# Patient Record
Sex: MALE | Race: OTHER | HISPANIC OR LATINO | URBAN - METROPOLITAN AREA
[De-identification: names, ages, dates, MRNs, and addresses within clinical notes are randomized per-mention and may not be internally consistent; named-entity substitution may affect disease eponyms.]

---

## 2019-07-22 ENCOUNTER — EMERGENCY (EMERGENCY)
Facility: HOSPITAL | Age: 29
LOS: 1 days | Discharge: HOME | End: 2019-07-22
Admitting: EMERGENCY MEDICINE
Payer: SELF-PAY

## 2019-07-22 VITALS
WEIGHT: 149.91 LBS | HEIGHT: 68 IN | SYSTOLIC BLOOD PRESSURE: 128 MMHG | RESPIRATION RATE: 16 BRPM | TEMPERATURE: 99 F | HEART RATE: 68 BPM | OXYGEN SATURATION: 98 % | DIASTOLIC BLOOD PRESSURE: 89 MMHG
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
## 2019-07-22 DIAGNOSIS — M25.561 PAIN IN RIGHT KNEE: ICD-10-CM
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
## 2019-07-22 DIAGNOSIS — S89.91XA UNSPECIFIED INJURY OF RIGHT LOWER LEG, INITIAL ENCOUNTER: ICD-10-CM

## 2019-07-22 DIAGNOSIS — F17.200 NICOTINE DEPENDENCE, UNSPECIFIED, UNCOMPLICATED: ICD-10-CM

## 2019-07-22 PROCEDURE — 99283 EMERGENCY DEPT VISIT LOW MDM: CPT

## 2019-07-22 PROCEDURE — 73562 X-RAY EXAM OF KNEE 3: CPT | Mod: 26,RT

## 2019-07-22 PROCEDURE — 73562 X-RAY EXAM OF KNEE 3: CPT

## 2019-07-22 RX ORDER — IBUPROFEN 200 MG
1 TABLET ORAL
Qty: 16 | Refills: 0
Start: 2019-07-22

## 2019-07-22 RX ORDER — IBUPROFEN 200 MG
600 TABLET ORAL ONCE
Refills: 0 | Status: COMPLETED | OUTPATIENT
Start: 2019-07-22 | End: 2019-07-22

## 2019-07-22 RX ADMIN — Medication 600 MILLIGRAM(S): at 21:25

## 2019-07-22 NOTE — ED PROVIDER NOTE - NSFOLLOWUPINSTRUCTIONS_ED_ALL_ED_FT
Use NSAID (ibuprofen) for pain as prescribed.  Follow up with orthopedist or orthopedic clinic (clinic is every Thursday).    Clinic phone number is [phone].  Return to the Emergency Department if you have any new or worsening symptoms, or if you have any concerns.      Use ibuprofeno para el dolor según lo prescrito.  Diego un seguimiento con el ortopedista o la clínica ortopédica (la clínica es todos los jueves).  El número de teléfono de la clínica es [phone].  Regrese al Departamento de Emergencias si tiene síntomas nuevos o que empeoran, o si tiene alguna inquietud.  _____________________________________________    Tratamiento RICE para cuidados de rutina de lesiones  RICE Therapy for Routine Care of Injuries  Introducción  Los cuidados de rutina de muchas lesiones incluyen reposo, hielo, compresión y elevación (tratamiento RICE). A menudo se recomienda el tratamiento de RICE para las lesiones de los tejidos blandos, por ejemplo, kenny distensión muscular, esguinces, moretones y lesiones por uso excesivo. También se puede utilizar para algunas lesiones óseas. El tratamiento RICE puede ayudar a aliviar el dolor y reducir la hinchazón.    Materiales necesarios:  Hielo.  Bolsa plástica.  Toalla.  Vendaje elástico.  Kenny o varias almohadas para mantener en alto (elevar) la parte lesionada del cuerpo.  Cómo cuidar la lesión con el tratamiento RICE  Image   Reposo     Diego reposar la clarice de la lesión. [place] puede ayudar con el proceso de curación. Habitualmente, el reposo implica limitar las actividades normales y evitar usar la parte lesionada del cuerpo. En general, puede retomar lashaun actividades normales cuando el médico lo autorice y usted pueda hacerlas sin muchas molestias.    Si melissa reposar demasiado la lesión, es posible que no se cure basilio. Algunas lesiones se curan mejor con el movimiento temprano en lugar de demasiado reposo. Hable con el médico sobre cómo limitar lashaun actividades y si usted debe comenzar con ejercicios de amplitud de movimiento para la lesión.    Hielo     Aplique hielo en la lesión para reducir la hinchazón y el dolor. No aplique el hielo directamente sobre la piel.  Ponga el hielo en kenny bolsa plástica.  Coloque kenny toalla entre la piel y la bolsa de hielo.  Coloque el hielo ministerio 20 minutos, de 2 a 3 veces por día. Use hielo tantos días geraldo se lo haya indicado el médico.  Compresión  ImageAplique presión (compresión) sobre la clarice lesionada para controlar la hinchazón, cristine apoyo y ayudar a aliviar las molestias. Kenny venda elástica sirve para la compresión. Si tiene kenny venda elástica, siga estos consejos generales:  Póngase la venda geraldo lo indica el fabricante de la venda que está usando.  No ajuste demasiado la venda. Eso puede bloquear (interrumpir) la circulación en la clarice del brazo o de la pierna por debajo de la venda.   Si kenny parte del cuerpo más allá de la venda se torna color luis, se adormece, se enfría, se hincha o le causa más dolor, es probable que la venda esté demasiado ajustada. Si eso ocurre, retire la venda y vuelva a colocarla más floja.  Quítese y vuelva a colocarse la venda cada 3 o 4 horas, o geraldo se lo haya indicado el médico.  Consulte al médico si la venda parece estar agravando los problemas en lugar de mejorándolos.  Elevación  Eleve la clarice lesionada para disminuir la hinchazón y el dolor. Si es posible, eleve la clarice lesionada al nivel o por encima del corazón o del centro del pecho.    Comuníquese con un médico si:  El dolor y la hinchazón continúan.  Los síntomas empeoran en vez de mejorar.  Tener estos problemas puede significar que usted necesita más evaluación y pruebas de diagnóstico por imágenes, geraldo radiografías o kenny resonancia magnética (RM). En algunos casos, las radiografías no muestran que hay un hueso pequeño roto (fractura) hasta días después de ocurrida la lesión. Concurra a las citas de seguimiento con ramírez médico. Consulte al médico o pregunte en el departamento donde se realiza la prueba de diagnóstico por imágenes, cuándo estarán listos los resultados.    Solicite ayuda de inmediato si:  Siente un dolor intenso repentino en la clarice de la lesión o por debajo de esta.  Tiene enrojecimiento o aumenta la hinchazón alrededor de la lesión.  Tiene hormigueo o adormecimiento en la clarice de la lesión o por debajo de esta que no mejoran después de quitarse la venda elástica.  Resumen  Los cuidados de rutina de muchas lesiones incluyen reposo, hielo, compresión y elevación (tratamiento RICE). El tratamiento RICE puede ayudar a aliviar el dolor y reducir la hinchazón.  A menudo se recomienda el tratamiento de RICE para las lesiones de los tejidos blandos, por ejemplo, kenny distensión muscular, esguinces, moretones y lesiones por uso excesivo. También se puede utilizar para algunas lesiones óseas.  Solicite atención médica si el dolor y la hinchazón continúan o si los síntomas empeoran en vez de mejorar.  Esta información no tiene geraldo fin reemplazar el consejo del médico. Asegúrese de hacerle al médico cualquier pregunta que tenga. Use NSAID (ibuprofen) for pain as prescribed.  Follow up with orthopedist or orthopedic clinic (clinic is every Thursday).    Clinic phone number is [phone].  Return to the Emergency Department if you have any new or worsening symptoms, or if you have any concerns.      Use ibuprofeno para el dolor según lo prescrito.  Herman un seguimiento con el ortopedista o la clínica ortopédica (la clínica es todos los jueves).  El número de teléfono de la clínica es [phone].  Regrese al Departamento de Emergencias si tiene síntomas nuevos o que empeoran, o si tiene alguna inquietud.  _____________________________________________    Tratamiento RICE para cuidados de rutina de lesiones  RICE Therapy for Routine Care of Injuries  Introducción  Los cuidados de rutina de muchas lesiones incluyen reposo, hielo, compresión y elevación (tratamiento RICE). A menudo se recomienda el tratamiento de RICE para las lesiones de los tejidos blandos, por ejemplo, kenny distensión muscular, esguinces, moretones y lesiones por uso excesivo. También se puede utilizar para algunas lesiones óseas. El tratamiento RICE puede ayudar a aliviar el dolor y reducir la hinchazón.    Materiales necesarios:  Hielo.  Bolsa plástica.  Toalla.  Vendaje elástico.  Kenny o varias almohadas para mantener en alto (elevar) la parte lesionada del cuerpo.  Cómo cuidar la lesión con el tratamiento RICE  Image   Reposo     [place] reposar la clarice de la lesión. [place] puede ayudar con el proceso de curación. Habitualmente, el reposo implica limitar las actividades normales y evitar usar la parte lesionada del cuerpo. En general, puede retomar lashaun actividades normales cuando el médico lo autorice y usted pueda hacerlas sin muchas molestias.    Si melissa reposar demasiado la lesión, es posible que no se cure basilio. Algunas lesiones se curan mejor con el movimiento temprano en lugar de demasiado reposo. Hable con el médico sobre cómo limitar lashaun actividades y si usted debe comenzar con ejercicios de amplitud de movimiento para la lesión.    Hielo     Aplique hielo en la lesión para reducir la hinchazón y el dolor. No aplique el hielo directamente sobre la piel.  Ponga el hielo en kenny bolsa plástica.  Coloque kenny toalla entre la piel y la bolsa de hielo.  Coloque el hielo ministerio 20 minutos, de 2 a 3 veces por día. Use hielo tantos días geraldo se lo haya indicado el médico.  Compresión  ImageAplique presión (compresión) sobre la clarice lesionada para controlar la hinchazón, cristine apoyo y ayudar a aliviar las molestias. Kenny venda elástica sirve para la compresión. Si tiene kenny venda elástica, siga estos consejos generales:  Póngase la venda geraldo lo indica el fabricante de la venda que está usando.  No ajuste demasiado la venda. Eso puede bloquear (interrumpir) la circulación en la clarice del brazo o de la pierna por debajo de la venda.   Si kenny parte del cuerpo más allá de la venda se torna color luis, se adormece, se enfría, se hincha o le causa más dolor, es probable que la venda esté demasiado ajustada. Si eso ocurre, retire la venda y vuelva a colocarla más floja.  Quítese y vuelva a colocarse la venda cada 3 o 4 horas, o geraldo se lo haya indicado el médico.  Consulte al médico si la venda parece estar agravando los problemas en lugar de mejorándolos.  Elevación  Eleve la clarice lesionada para disminuir la hinchazón y el dolor. Si es posible, eleve la clarice lesionada al nivel o por encima del corazón o del centro del pecho.    Comuníquese con un médico si:  El dolor y la hinchazón continúan.  Los síntomas empeoran en vez de mejorar.  Tener estos problemas puede significar que usted necesita más evaluación y pruebas de diagnóstico por imágenes, geraldo radiografías o kenny resonancia magnética (RM). En algunos casos, las radiografías no muestran que hay un hueso pequeño roto (fractura) hasta días después de ocurrida la lesión. Concurra a las citas de seguimiento con ramírez médico. Consulte al médico o pregunte en el departamento donde se realiza la prueba de diagnóstico por imágenes, cuándo estarán listos los resultados.    Solicite ayuda de inmediato si:  Siente un dolor intenso repentino en la clarice de la lesión o por debajo de esta.  Tiene enrojecimiento o aumenta la hinchazón alrededor de la lesión.  Tiene hormigueo o adormecimiento en la clarice de la lesión o por debajo de esta que no mejoran después de quitarse la venda elástica.  Resumen  Los cuidados de rutina de muchas lesiones incluyen reposo, hielo, compresión y elevación (tratamiento RICE). El tratamiento RICE puede ayudar a aliviar el dolor y reducir la hinchazón.  A menudo se recomienda el tratamiento de RICE para las lesiones de los tejidos blandos, por ejemplo, kenny distensión muscular, esguinces, moretones y lesiones por uso excesivo. También se puede utilizar para algunas lesiones óseas.  Solicite atención médica si el dolor y la hinchazón continúan o si los síntomas empeoran en vez de mejorar.  Esta información no tiene geraldo fin reemplazar el consejo del médico. Asegúrese de hacerle al médico cualquier pregunta que tenga.  ___________________________________________________    Uso de muletas en los adultos  Crutch Use, Adult  Introducción  Las muletas se utilizan para aliviar el peso de kenny pierna o de un pie cuando está parado o camina. Es posible que necesite muletas para recuperarse después de kenny lesión o un procedimiento. Es importante usar muletas del tamaño adecuado. Cuando las muletas son del tamaño adecuado:  Debe taylor un espacio de 2 a 3 dedos entre cada muleta y la axila.  El peso debe recaer en ramírez mano y no en la axila.  Es importante que un médico lo haya visto usar las muletas de manera efectiva antes de que las utilice en ramírez casa.    ¿Cuáles son los riesgos?  El uso inadecuado de las muletas puede lesionar los hombros, los brazos, la espalda, las axilas y las km. Para evitar que esto suceda, asegúrese de que las muletas madisyn del tamaño adecuado y no herman presión sobre las axilas cuando las use.    Al usar muletas, también tiene un riesgo mayor de caerse. Para evitar caídas mientras usa las muletas en ramírez casa o en el trabajo, tome las siguientes medidas:  Mueva los muebles o los obstáculos que se encuentran en el paso cuando sea posible. [place]ídale a alguien que lo ayude con esto según necesite.  Mantenga los pasillos basilio iluminados.  Use kenny mochila para no tener que llevar objetos en las km.  Quite alfombras, cables y otros elementos del piso con los que pueda tropezarse.  Cómo usar lashaun muletas  La manera en la que usará las muletas dependerá del motivo por el cual las necesita. Es posible que el médico le indique que no ponga nada de peso sobre la pierna afectada (descarga). O maninder vez el médico le permita poner algo de peso, sweetie no todo, sobre la pierna afectada (carga parcial). Siga las indicaciones del médico acerca del peso que puede soportar. No soporte un peso que le provoque dolor en la clarice afectada.    Caminar     Párese sobre la pierna hillary y levante ambas muletas al mismo tiempo.    Coloque las muletas a kenny distancia de un paso adelante de usted.    Lleve la pierna hillary hacia adelante para alcanzar o aterrizar ligeramente kendra de las muletas.    Repita el procedimiento.    Subir escalones     ImageSi no hay barandas:  Suba con la pierna hillary.    Suba con las muletas y la pierna lesionada.    Repita el procedimiento.    Si hay barandas:  Sostenga ambas muletas en kenny mano.    Coloque la otra mano en la baranda.    Coloque el peso en los brazos y suba el escalón con la pierna hillary.    Lleve las muletas y la pierna lesionada hasta el escalón.    Continúe de esta forma.    Si se siente inestable en los escalones, puede subir apoyando las nalgas. Para subir, siéntese en el escalón más bajo con la pierna lesionada adelante y sostenga ambas muletas apoyadas contra los escalones en la otra mano. Luego, use la mano iban y la pierna hillary para sostenerse y levantar las nalgas hasta el siguiente escalón.    Bajar escalones     ImageSi no hay barandas:  Baje con la pierna lesionada y las muletas.    Baje con la pierna hillary.    Repita el procedimiento.    Si hay barandas:  Coloque la mano en la baranda.    Sostenga ambas muletas con la mano iban.    Baje la pierna lesionada y la muleta hasta el escalón debajo de usted. Asegúrese de mantener las puntas de las muletas en el centro del escalón, no en el borde.    Baje la pierna hillary hasta el siguiente escalón.    Repita el procedimiento.    Si se siente inestable en los escalones, puede bajar apoyando las nalgas. Para bajar, siéntese en el escalón más alto con la pierna lesionada adelante y sostenga ambas muletas apoyadas contra los escalones en la otra mano. Luego, use la mano iban y la pierna hillary para sostenerse y bajar las nalgas hasta el siguiente escalón.    Pararse     Sostenga la pierna lesionada hacia adelante.    Agarre el apoyabrazos con kenny mano y la parte superior de las muletas con la otra mano.    Utilizando el apoyabrazos y las muletas, empújese hacia arriba hasta ponerse de pie.    Sentarse     Sostenga la pierna lesionada hacia adelante.    Agarre el apoyabrazos con kenny mano y la parte superior de las muletas con la otra mano.    Baje el cuerpo lentamente hasta lograr sentarse.    Comuníquese con un médico si:  Se siente inestable al utilizar las muletas.  Aparece un dolor nuevo.  Presenta adormecimiento u hormigueo.  Las muletas no son del tamaño adecuado.  Solicite ayuda de inmediato si:  Se .  Esta información no tiene geraldo fin reemplazar el consejo del médico. Asegúrese de hacerle al médico cualquier pregunta que tenga.

## 2019-07-22 NOTE — ED PROVIDER NOTE - CLINICAL SUMMARY MEDICAL DECISION MAKING FREE TEXT BOX
Right knee injury: likely strain or sprain.  Low suspicion for fx.  No signs of skin or joint infection.  Plan: RICE, NSAIDs, ortho follow up.

## 2019-07-22 NOTE — ED PROVIDER NOTE - OBJECTIVE STATEMENT
healthy 30 yo m was at work today carrying a vacuum  while walking up stairs when he felt a "pop" and pain in the anterior right knee.  no direct trauma to knee.  no hx of knee problems, and no hx of clicking or locking.  pain severe and he called an ambulance.  able to limp with only partial weight bearing.  no other injuries.

## 2019-07-22 NOTE — ED PROVIDER NOTE - NS ED ROS FT
CONST:  no fever/chills  NEURO: no tingling or numbness  CARDIO: no chest pain  PULM: no dyspnea   GI: no abdominal pain, nausea or vomiting  SKIN: no rash  MSK: no swelling of knee/leg; no locking or clicking

## 2019-07-22 NOTE — ED ADULT NURSE NOTE - NSIMPLEMENTINTERV_GEN_ALL_ED
Implemented All Universal Safety Interventions:  [place] to call system. Call bell, personal items and telephone within reach. Instruct patient to call for assistance. Room bathroom lighting operational. Non-slip footwear when patient is off stretcher. Physically safe environment: no spills, clutter or unnecessary equipment. Stretcher in lowest position, wheels locked, appropriate side rails in place.

## 2019-07-22 NOTE — ED PROVIDER NOTE - PHYSICAL EXAMINATION
GEN: awake, alert, NAD  EYES: Conj clear, Pupils equal and round.  PULM: Lungs clear bilat  CV: RRR S1S2  GI: Abd soft, nontender  MSK: Rt Hip FROM nontender.  Right knee no swelling, patella not ballottable.  FROM. No crepitus.  Mild medial joint line tenderness.  Able SLR.  No lig laxity MCL, LCL, ACL. PCL.  Derek neg.  No lower leg swelling or calf tenderness. Ankle FROM nontender.  Strong DP and PT pulses.  + antalgic limp.  Foot no swelling.  WWP, brisk cap refill.    SKIN: normal color and turgor, no rash  NEURO: Alert, oriented. STEVENSON normally.  Speech clear.  Gait steady.

## 2019-07-22 NOTE — ED ADULT NURSE NOTE - OBJECTIVE STATEMENT
29y M, A&ox3, presents to ed for right knee pain. No numbness nor tingling. no obvious deformity. Reports twist knee. no head injury, no abrasions nor redness. +ROM. +pulse

## 2019-07-22 NOTE — ED ADULT NURSE NOTE - CHPI ED NUR SYMPTOMS NEG
no abrasion/no difficulty bearing weight/no stiffness/no tingling/no back pain/no bruising/no deformity/no numbness/no weakness/no fever

## 2019-07-22 NOTE — ED PROVIDER NOTE - CARE PROVIDER_API CALL
Masood Gresham)  Orthopaedic Surgery  [address]  [place], [place] [address]  Phone: [phone]  Fax: [phone]  Follow Up Time:

## 2019-07-22 NOTE — ED ADULT TRIAGE NOTE - CHIEF COMPLAINT QUOTE
Pt BIBA CO Right Knee pain this afternoon.  Pt states "It just started hurting me."  PT denies falls, trauma, dizziness, N/V/D, SOB, Fevers and CP.

## 2019-12-27 ENCOUNTER — EMERGENCY (EMERGENCY)
Facility: HOSPITAL | Age: 29
LOS: 1 days | Discharge: HOME | End: 2019-12-27
Admitting: EMERGENCY MEDICINE
Payer: COMMERCIAL

## 2019-12-27 VITALS
OXYGEN SATURATION: 97 % | DIASTOLIC BLOOD PRESSURE: 70 MMHG | HEART RATE: 95 BPM | SYSTOLIC BLOOD PRESSURE: 132 MMHG | WEIGHT: 149.91 LBS | TEMPERATURE: 99 F | RESPIRATION RATE: 16 BRPM

## 2019-12-27 PROCEDURE — 99284 EMERGENCY DEPT VISIT MOD MDM: CPT | Mod: 25,57

## 2019-12-27 PROCEDURE — 73140 X-RAY EXAM OF FINGER(S): CPT

## 2019-12-27 PROCEDURE — 26750 TREAT FINGER FRACTURE EACH: CPT | Mod: F6

## 2019-12-27 PROCEDURE — 26750 TREAT FINGER FRACTURE EACH: CPT | Mod: 54,RT

## 2019-12-27 PROCEDURE — 99283 EMERGENCY DEPT VISIT LOW MDM: CPT | Mod: 25

## 2019-12-27 PROCEDURE — 73140 X-RAY EXAM OF FINGER(S): CPT | Mod: 26,RT

## 2019-12-27 RX ORDER — IBUPROFEN 200 MG
600 TABLET ORAL ONCE
Refills: 0 | Status: COMPLETED | OUTPATIENT
Start: 2019-12-27 | End: 2019-12-27

## 2019-12-27 NOTE — ED PROVIDER NOTE - CLINICAL SUMMARY MEDICAL DECISION MAKING FREE TEXT BOX
28 yo M with no pmh c/o R index finger pain s/p closing his finger in a door 2 days ago. Denies fever, chills, numbness, tingling. 30 yo M with no pmh c/o R index finger pain s/p closing his finger in a door 2 days ago. Denies fever, chills, numbness, tingling. R index finger- +small subungal hematoma to corner of nail with healing abrasion/small lac, tenderness to distal phalanx; NV intact 30 yo M with no pmh c/o R index finger pain s/p closing his finger in a door 2 days ago. Denies fever, chills, numbness, tingling. R index finger- +small subungal hematoma to corner of nail with healing abrasion/small lac, tenderness to distal phalanx; NV intact. Xray with small partial tip avulsion fx, splinted, will f/u with hand

## 2019-12-27 NOTE — ED PROVIDER NOTE - PHYSICAL EXAMINATION
CONSTITUTIONAL: Well-appearing; well-nourished; in no apparent distress.   HEAD: Normocephalic; atraumatic.   EYES: PERRL; EOM intact; conjunctiva and sclera clear  ENT: normal nose; no rhinorrhea; normal pharynx with no erythema or lesions.   NECK: Supple; non-tender;   CARDIOVASCULAR: Normal S1, S2; no murmurs, rubs, or gallops. Regular rate and rhythm.   RESPIRATORY: Breathing easily; breath sounds clear and equal bilaterally; no wheezes, rhonchi, or rales.  MSK: R index finger- +small subungal hematoma to corner of nail with healing abrasion/small lac, tenderness to distal phalanx; NV intact   EXT: No cyanosis or edema; N/V intact  SKIN: Normal for age and race; warm; dry; good turgor; no apparent lesions or rash.

## 2019-12-27 NOTE — ED ADULT NURSE NOTE - OBJECTIVE STATEMENT
Pt. c/o R index finger pain and swelling s/p closing door on finger 2 days ago. Pt. took tylenol yesterday and ibuprofen at 1600 w/ no pain relief.

## 2019-12-27 NOTE — ED PROVIDER NOTE - OBJECTIVE STATEMENT
30 yo M with no pmh c/o R index finger pain s/p closing his finger in a door 2 days ago. Denies fever, chills, numbness, tingling.

## 2019-12-27 NOTE — ED PROVIDER NOTE - CARE PLAN
Principal Discharge DX:	Closed nondisplaced fracture of distal phalanx of right index finger, initial encounter

## 2019-12-27 NOTE — ED PROVIDER NOTE - NSFOLLOWUPINSTRUCTIONS_ED_ALL_ED_FT
Fractura de dedo    LO QUE NECESITA SABER:    Kenny fractura del dedo de la mano es kenny rotura en jamie o más de los huesos en el dedo de la mano.    INSTRUCCIONES SOBRE EL [facility]EDA HOSPITALARIA:    Regrese a la kayli de emergencias si:    El yeso o la férula se mojan, se dañan o se le caen.      El yeso o la férula se sienten muy apretados.      Usted tiene dolor intenso.      El dedo lesionado se encuentra entumecido frío o pálido.    Llame a ramírez médico o especialista en km si:    Aun después del tratamiento, empeora ramírez dolor o inflamación.      Usted tiene preguntas o inquietudes acerca de ramírez condición o cuidado.    Medicamentos:Es posible que usted necesite alguno de los siguientes:     Los CLEVE,geraldo el ibuprofeno, ayudan a disminuir la inflamación, el dolor y la fiebre. Michael medicamento está disponible con o sin kenny receta médica. Los CLEVE pueden causar sangrado estomacal o problemas renales en ciertas personas. Si usted santy un medicamento anticoagulante, siempre pregúntele a ramírez médico si los CLEVE son seguros para usted. Siempre radha la etiqueta de michael medicamento y siga las instrucciones.      Acetaminofénalivia el dolor y baja la fiebre. Está disponible sin receta médica. Pregunte la cantidad y la frecuencia con que debe tomarlos. Siga las indicaciones. Radha las etiquetas de todos los demás medicamentos que esté usando para saber si también contienen acetaminofén, o pregunte a ramírez médico o farmacéutico. El acetaminofén puede causar daño en el hígado cuando no se santy de forma correcta. No use más de 4 gramos (4000 miligramos) en total de acetaminofeno en un día.      Puede administrarsepodrían administrarse. Pregunte al médico cómo debe rivera michael medicamento de forma larios. Algunos medicamentos recetados para el dolor contienen acetaminofén. No tome otros medicamentos que contengan acetaminofén sin consultarlo con ramírez médico. Demasiado acetaminofeno puede causar daño al hígado. Los medicamentos recetados para el dolor podrían causar estreñimiento. Pregunte a ramírez médico geraldo prevenir o tratar estreñimiento.      [place] lashaun medicamentos geraldo se le haya indicado.Consulte con ramírez médico si usted zan que ramírez medicamento no le está ayudando o si presenta efectos secundarios. Infórmele si es alérgico a cualquier medicamento. Mantenga kenny lista actualizada de los medicamentos, las vitaminas y los productos herbales que santy. Incluya los siguientes datos de los medicamentos: cantidad, frecuencia y motivo de administración. Traiga con usted la lista o los envases de las píldoras a lashaun citas de seguimiento. Lleve la lista de los medicamentos con usted en dinorah de kenny emergencia.    Cuidados personales:    Use ramírez férula geraldo se le indique.No retire la férula sin la autorización del médico o especialista de mano.      Aplique hieloen el dedo de 15 a 20 minutos cada hora o geraldo se le indique. Use kenny compresa de hielo o ponga hielo triturado en kenny bolsa de plástico. Cúbralo con kenny toalla antes de aplicarlo sobre ramírez piel. El hielo ayuda a evitar daño al tejido y a disminuir la inflamación y el dolor.      Eleveel dedo por encima del nivel del corazón con la mayor frecuencia posible. [place] va a disminuir inflamación y el dolor. Coloque ramírez mano sobre almohadas o cobijas para mantenerlas elevadas cómodamente.      Vaya a terapia física según indicaciones.Un fisioterapeuta le puede enseñar ejercicios para ayudarle a mejorar el movimiento y la fuerza, y para disminuir el dolor.    Acuda a kenny consulta de control con ramírez médico o especialista en km dentro de los 2 días:[place] lashaun preguntas para que se acuerde de hacerlas ministerio lashaun visitas.

## 2019-12-27 NOTE — ED PROVIDER NOTE - PATIENT PORTAL LINK FT
You can access the FollowMyHealth Patient Portal offered by [facility] by registering at the following website: [email or web address]. By joining [employer]’s FollowMyHealth portal, you will also be able to view your health information using other applications (apps) compatible with our system.

## 2019-12-27 NOTE — ED PROVIDER NOTE - CARE PROVIDER_API CALL
Tara So)  Orthopaedic Surgery  [address]  [place] [address]  Phone: [phone]  Fax: [phone]  Follow Up Time:     Chon Godinez)  Plastic Surgery; Surgery of the Hand  [address], [address]  [place] [address]  Phone: [phone]  Fax: [phone]  Follow Up Time:

## 2019-12-27 NOTE — ED PROVIDER NOTE - DIAGNOSTIC INTERPRETATION
ER PA: Aster Nash  INTERPRETATION: small tip partial avulsion fx; no soft tissue swelling noted; normal bony alignment.

## 2019-12-28 PROBLEM — Z78.9 OTHER SPECIFIED HEALTH STATUS: Chronic | Status: ACTIVE | Noted: 2019-07-22
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
## 2020-01-02 DIAGNOSIS — Z79.1 LONG TERM (CURRENT) USE OF NON-STEROIDAL ANTI-INFLAMMATORIES (NSAID): ICD-10-CM

## 2020-01-02 DIAGNOSIS — S62.660A NONDISPLACED FRACTURE OF DISTAL PHALANX OF RIGHT INDEX FINGER, INITIAL ENCOUNTER FOR CLOSED FRACTURE: ICD-10-CM

## 2020-01-02 DIAGNOSIS — M79.644 PAIN IN RIGHT FINGER(S): ICD-10-CM

## 2020-01-02 DIAGNOSIS — S60.221A CONTUSION OF RIGHT HAND, INITIAL ENCOUNTER: ICD-10-CM
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
## 2022-08-24 ENCOUNTER — EMERGENCY (EMERGENCY)
Facility: HOSPITAL | Age: 32
LOS: 1 days | Discharge: HOME | End: 2022-08-24
Attending: EMERGENCY MEDICINE | Admitting: EMERGENCY MEDICINE
Payer: OTHER MISCELLANEOUS

## 2022-08-24 VITALS
OXYGEN SATURATION: 96 % | SYSTOLIC BLOOD PRESSURE: 139 MMHG | HEIGHT: 68 IN | WEIGHT: 179.02 LBS | DIASTOLIC BLOOD PRESSURE: 93 MMHG | RESPIRATION RATE: 16 BRPM | TEMPERATURE: 98 F | HEART RATE: 61 BPM

## 2022-08-24 PROCEDURE — 72220 X-RAY EXAM SACRUM TAILBONE: CPT | Mod: 26

## 2022-08-24 PROCEDURE — 72220 X-RAY EXAM SACRUM TAILBONE: CPT

## 2022-08-24 PROCEDURE — 99284 EMERGENCY DEPT VISIT MOD MDM: CPT | Mod: 25

## 2022-08-24 PROCEDURE — 72110 X-RAY EXAM L-2 SPINE 4/>VWS: CPT | Mod: 26

## 2022-08-24 PROCEDURE — 72100 X-RAY EXAM L-S SPINE 2/3 VWS: CPT

## 2022-08-24 PROCEDURE — 72100 X-RAY EXAM L-S SPINE 2/3 VWS: CPT | Mod: 26

## 2022-08-24 RX ORDER — IBUPROFEN 200 MG
600 TABLET ORAL ONCE
Refills: 0 | Status: COMPLETED | OUTPATIENT
Start: 2022-08-24 | End: 2022-08-24

## 2022-08-24 RX ORDER — IBUPROFEN 200 MG
1 TABLET ORAL
Qty: 30 | Refills: 0
Start: 2022-08-24 | End: 2022-09-02

## 2022-08-24 RX ADMIN — Medication 600 MILLIGRAM(S): at 20:03

## 2022-08-24 NOTE — ED PROVIDER NOTE - OBJECTIVE STATEMENT
fell today at work on stairs, down about 4 stairs.  fell onto back/tailbone  co pain in lower back/tailbone  able to walk  no numbness weakness  no other injuries, did not hit head

## 2022-08-24 NOTE — ED PROVIDER NOTE - PATIENT PORTAL LINK FT
You can access the FollowMyHealth Patient Portal offered by [facility] by registering at the following website: [email or web address]. By joining [employer]’s FollowMyHealth portal, you will also be able to view your health information using other applications (apps) compatible with our system.

## 2022-08-24 NOTE — ED ADULT TRIAGE NOTE - CHIEF COMPLAINT QUOTE
Pt c/o mechanical fall down 4 stairs, landing on buttocks. Reports lower back pain. Denies head injury, LOC, numbness/tingling, not on blood thinners.

## 2022-08-24 NOTE — ED PROVIDER NOTE - CROS ED CARDIOVAS ALL NEG
Patient Education        A Healthy Lifestyle: Care Instructions  Your Care Instructions     A healthy lifestyle can help you feel good, stay at a healthy weight, and have plenty of energy for both work and play. A healthy lifestyle is something you can share with your whole family. A healthy lifestyle also can lower your risk for serious health problems, such as high blood pressure, heart disease, and diabetes. You can follow a few steps listed below to improve your health and the health of your family. Follow-up care is a key part of your treatment and safety. Be sure to make and go to all appointments, and call your doctor if you are having problems. It's also a good idea to know your test results and keep a list of the medicines you take. How can you care for yourself at home? · Do not eat too much sugar, fat, or fast foods. You can still have dessert and treats now and then. The goal is moderation. · Start small to improve your eating habits. Pay attention to portion sizes, drink less juice and soda pop, and eat more fruits and vegetables. ? Eat a healthy amount of food. A 3-ounce serving of meat, for example, is about the size of a deck of cards. Fill the rest of your plate with vegetables and whole grains. ? Limit the amount of soda and sports drinks you have every day. Drink more water when you are thirsty. ? Eat plenty of fruits and vegetables every day. Have an apple or some carrot sticks as an afternoon snack instead of a candy bar. Try to have fruits and/or vegetables at every meal.  · Make exercise part of your daily routine. You may want to start with simple activities, such as walking, bicycling, or slow swimming. Try to be active 30 to 60 minutes every day. You do not need to do all 30 to 60 minutes all at once. For example, you can exercise 3 times a day for 10 or 20 minutes.  Moderate exercise is safe for most people, but it is always a good idea to talk to your doctor before starting an exercise program.  · Keep moving. Matteo Bidding the lawn, work in the garden, or [employer]. Take the stairs instead of the elevator at work. · If you smoke, quit. People who smoke have an increased risk for heart attack, stroke, cancer, and other lung illnesses. Quitting is hard, but there are ways to boost your chance of quitting tobacco for good. ? Use nicotine gum, patches, or lozenges. ? Ask your doctor about stop-smoking programs and medicines. ? Keep trying. In addition to reducing your risk of diseases in the future, you will notice some benefits soon after you stop using tobacco. If you have shortness of breath or asthma symptoms, they will likely get better within a few weeks after you quit. · Limit how much alcohol you drink. Moderate amounts of alcohol (up to 2 drinks a day for men, 1 drink a day for women) are okay. But drinking too much can lead to liver problems, high blood pressure, and other health problems. Family health  If you have a family, there are many things you can do together to improve your health. · Eat meals together as a family as often as possible. · Eat healthy foods. This includes fruits, vegetables, lean meats and dairy, and whole grains. · Include your family in your fitness plan. Most people think of activities such as jogging or tennis as the way to fitness, but there are many ways you and your family can be more active. Anything that makes you breathe hard and gets your heart pumping is exercise. Here are some tips:  ? Walk to do errands or to take your child to school or the bus.  ? Go for a family bike ride after dinner instead of watching TV. Where can you learn more? Go to https://[email or web address] org and sign in to your [employer] account. Enter X365 in the [employer] box to learn more about \"A Healthy Lifestyle: Care Instructions. \"     If you do not have an account, please click on the \"Sign Up Now\" link.   Current as of: June 16, 2021               Content Version: 13.0  © 5545-3967 Healthwise, [employer]. Care instructions adapted under license by [facility] ([facility]). If you have questions about a medical condition or this instruction, always ask your healthcare professional. [address] any warranty or liability for your use of this information. negative...

## 2022-08-24 NOTE — ED PROVIDER NOTE - NSFOLLOWUPINSTRUCTIONS_ED_ALL_ED_FT
follow up regular doctor in next 1 week        Tailbone Injury       The tailbone (coccyx) is the small bone at the lower end of the spine. A tailbone injury may involve stretched ligaments, bruising, or a broken bone (fracture). Tailbone injuries can be painful, and some may take a long time to heal.      What are the causes?    This condition may be caused by:  •Falling and landing on the tailbone.      •Repeated strain or friction from sitting for long periods of time. This may include actions such as rowing and bicycling.      •Childbirth.      In some cases, the cause may not be known.      What are the signs or symptoms?    Symptoms of this condition include:  •Pain in the tailbone area or lower back, especially when sitting.      •Pain or difficulty when standing up from a sitting position.      •Bruising or swelling in the tailbone area.      •Painful bowel movements.      •In women, pain during intercourse.        How is this diagnosed?    This condition may be diagnosed based on:  •Your symptoms.      •A physical exam.      If your health care provider suspects a fracture, you may have additional tests, such as:  •X-rays.      •CT scan.      •MRI.        How is this treated?    Most tailbone injuries heal on their own in 4–6 weeks. However, recovery time may be longer if the injury involves a fracture.    Treatment for this condition may include:  •NSAIDs or other over-the-counter medicines to help relieve your pain.      •Using a large, rubber or inflated ring or cushion to take pressure off the tailbone when sitting.      •Physical therapy.      •Injecting the tailbone area with local anesthesia and steroid medicine. This is not normally needed unless the pain does not improve over time with over-the-counter pain medicines.        Follow these instructions at home:    Activity     •Avoid sitting for long periods of time.    •To prevent repeating an injury that is caused by strain or friction:  •Wear appropriate padding and sports gear when bicycling and rowing.        •Increase your activity as the pain allows. Perform any exercises that are recommended by your health care provider or physical therapist.      Managing pain, stiffness, and swelling   •To help decrease discomfort when sitting:   •Sit on your rubber or inflated ring or cushion as told by your health care provider.      •Lean forward when you sit.      •If directed, apply ice to the injured area:  •Put ice in a plastic bag.      •Place a towel between your skin and the bag.      •Leave the ice on for 20 minutes, 2–3 times per day for the first 1–2 days.      •If directed, apply heat to the affected area as often as told by your health care provider. Use the heat source that your health care provider recommends, such as a moist heat pack or a heating pad.  •Place a towel between your skin and the heat source.      •Leave the heat on for 20–30 minutes.      •Remove the heat if your skin turns bright red. This is especially important if you are unable to feel pain, heat, or cold. You may have a greater risk of getting burned.        General instructions     •Take over-the-counter and prescription medicines only as told by your health care provider.    •To prevent or treat constipation or painful bowel movements, your health care provider may recommend that you:  •Drink enough fluid to keep your urine pale yellow.      •Eat foods that are high in fiber, such as fresh fruits and vegetables, whole grains, and beans.      •Limit foods that are high in fat and processed sugars, such as fried and sweet foods.      •Take an over-the-counter or prescription medicine for constipation.        •Keep all follow-up visits as directed by your health care provider. This is important.        Contact a health care provider if:    •Your pain becomes worse or is not controlled with medicine.      •Your bowel movements cause a great deal of discomfort.      •You are unable to have a bowel movement after 4 days.      •You have pain during intercourse.        Summary    •A tailbone injury may involve stretched ligaments, bruising, or a broken bone (fracture).      •Tailbone injuries can be painful. Most heal on their own in 4–6 weeks.      •Treatment may include taking NSAIDs, using a rubber or inflated ring or cushion when sitting, and physical therapy.      •Follow any recommendations from your health care provider to prevent or treat constipation.      This information is not intended to replace advice given to you by your health care provider. Make sure you discuss any questions you have with your health care provider.      Document Revised: 01/15/2019 Document Reviewed: 01/15/2019    [employer] Patient Education © 2022 [employer]

## 2022-08-24 NOTE — ED PROVIDER NOTE - CLINICAL SUMMARY MEDICAL DECISION MAKING FREE TEXT BOX
healthy 33 yo male with fall at work on stairs onto back/buttocks.  diffusely tender low back/sacrum/coccyx.  able to ambulate.  no neuro findings.  will give nsaids, did xray showed no apparent fx but dw patient bruising coccyx may takes weeks to resolve, suggested buying doughnut pillow, rx for nsaids given, fu pmd

## 2022-08-24 NOTE — ED PROVIDER NOTE - QUALITY
[Dear  ___] : Dear  [unfilled], [Consult Letter:] : I had the pleasure of evaluating your patient, [unfilled]. [Please see my note below.] : Please see my note below. [Consult Closing:] : Thank you very much for allowing me to participate in the care of this patient.  If you have any questions, please do not hesitate to contact me. [Sincerely,] : Sincerely, [FreeTextEntry3] : Destinee Castaneda M.D.\par  aching

## 2022-08-24 NOTE — ED PROVIDER NOTE - CARE PLAN
Principal Discharge DX:	Contusion of back, initial encounter  Secondary Diagnosis:	Fall down stairs, initial encounter   1

## 2022-08-24 NOTE — ED ADULT TRIAGE NOTE - PRO INTERPRETER NEED 2
Quality 402: Tobacco Use And Help With Quitting Among Adolescents: Patient screened for tobacco and never smoked Quality 431: Preventive Care And Screening: Unhealthy Alcohol Use - Screening: Patient screened for unhealthy alcohol use using a single question and scores less than 2 times per year Quality 110: Preventive Care And Screening: Influenza Immunization: Influenza Immunization Administered during Influenza season English Quality 130: Documentation Of Current Medications In The Medical Record: Current Medications with Name, Dosage, Frequency, or Route not Documented, Reason not Given Detail Level: Detailed

## 2022-08-28 DIAGNOSIS — M54.50 LOW BACK PAIN, UNSPECIFIED: ICD-10-CM
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
## 2022-08-28 DIAGNOSIS — S30.0XXA CONTUSION OF LOWER BACK AND PELVIS, INITIAL ENCOUNTER: ICD-10-CM
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
## 2022-12-03 NOTE — ED ADULT NURSE NOTE - OBJECTIVE STATEMENT
pt presents to ER c/o lower back pain after slipping on the stair and falling down several steps. pt denies head strike or loc. pt denies numbness. pt ambulating steadily.
Allergy;

## 2023-10-09 NOTE — ED ADULT TRIAGE NOTE - WEIGHT METHOD
Zoryve Pregnancy And Lactation Text: It is unknown if this medication can cause problems during pregnancy and breastfeeding. stated

## 2023-11-09 NOTE — ED PROVIDER NOTE - NS ED MD DISPO DISCHARGE
----- Message from Amaila Her sent at 11/9/2023 10:11 AM CST -----  Contact: self  Type: Appointment Access    Name: Mila Crum  Reason for visit: est care/having some problems  When does the patient need to be seen?: As soon as possible  Next Available Appt: 11/22  Requesting to see: Apryl Pereira  Best Call Back Number: [phone]  Additional Info: N/a     Home
